# Patient Record
Sex: MALE | Race: WHITE | NOT HISPANIC OR LATINO | URBAN - METROPOLITAN AREA
[De-identification: names, ages, dates, MRNs, and addresses within clinical notes are randomized per-mention and may not be internally consistent; named-entity substitution may affect disease eponyms.]

---

## 2022-11-23 ENCOUNTER — TELEPHONE (OUTPATIENT)
Dept: INTERVENTIONAL RADIOLOGY/VASCULAR | Facility: CLINIC | Age: 62
End: 2022-11-23

## 2022-11-23 NOTE — TELEPHONE ENCOUNTER
Writer has spoken with Grecia, (Tay's spouse) regarding planned procedure with IR via telephone.  She acknowledges understanding of pre-procedure instructions.      Medication list noted in [facility] documentation reviewed and Grecia denies any blood thinning medications or insulin containing medications in use.        Tay plans on a home rapid COVID test prior to procedure date.  I have provided Grecia & Tay with IR number [phone]) for questions or concerns.    A documented H&P exam is noted in patient EMR with the date 11/11/2022.    Karen CUTLER  Interventional Radiology RN   [phone]

## 2022-11-23 NOTE — TELEPHONE ENCOUNTER
INTERVENTIONAL RADIOLOGY INSTRUCTIONS     You are scheduled for an upcoming procedure in the   Interventional Radiology Department at [facility].     Date:  Wednesday November 30     Procedure: PORT placement     Address: [facility] [address] [place], [place] [address]     Free parking is available for patients & visitors in [address] or [address].  [address] is closest to the main entrance of hospital.    Check in at main entrance WELCOME DESK.        Check into the Radiology Department at: 11:00 am    Do not eat any food or drink any fluids after: 03:00 am         Two visitors may accompany you to your procedure.      You will need to have someone available to drive you home.      We recommend that you have a responsible adult stay with you for 24 hours after you get home.      Please bring list of current medications to your appointment.      Please take all of your medications as prescribed with a small sip of water, unless you are contacted by a nurse from our department to hold them.       COVID TESTING REQUIREMENTS      You will need to take an at-home, rapid antigen COVID test 1 to 2 days before your procedure.    -  If the results are negative, you need to bring a photo of the results to the procedure.   -  If your results are positive, do not come to your appointment and please call [phone].      Please direct further questions regarding COVID testing to [phone] or visit [email or web address].        Your procedure will be delayed/ possibly canceled if you do not have a COVID test done within the appropriate time frame.           If you have any questions, please call the IR nurses at [phone].     Thank you!    Karen CUTLER  Interventional Radiology Intake Nurse Coordinator  [phone]